# Patient Record
Sex: MALE | Race: OTHER | HISPANIC OR LATINO
[De-identification: names, ages, dates, MRNs, and addresses within clinical notes are randomized per-mention and may not be internally consistent; named-entity substitution may affect disease eponyms.]

---

## 2023-06-30 ENCOUNTER — NON-APPOINTMENT (OUTPATIENT)
Age: 48
End: 2023-06-30

## 2023-07-04 PROBLEM — Z00.00 ENCOUNTER FOR PREVENTIVE HEALTH EXAMINATION: Status: ACTIVE | Noted: 2023-07-04

## 2023-07-05 ENCOUNTER — APPOINTMENT (OUTPATIENT)
Dept: ORTHOPEDIC SURGERY | Facility: CLINIC | Age: 48
End: 2023-07-05
Payer: COMMERCIAL

## 2023-07-05 VITALS — WEIGHT: 190 LBS | HEIGHT: 68 IN | BODY MASS INDEX: 28.79 KG/M2

## 2023-07-05 DIAGNOSIS — Z78.9 OTHER SPECIFIED HEALTH STATUS: ICD-10-CM

## 2023-07-05 PROCEDURE — 99204 OFFICE O/P NEW MOD 45 MIN: CPT

## 2023-07-05 NOTE — ASSESSMENT
[FreeTextEntry1] : The condition was explained to the patient.\par Discussed the risks and benefits of surgical vs non-surgical treatment. Patient elected to proceed with surgery.\par Discussed the risk of persistent pain, swelling, stiffness, loss of motion, weakness, and post-traumatic arthritis. Surgery also carries the risk of bleeding, infection - which may require antibiotics or surgical debridement - and risk of injury to tendons, blood vessels, and nerves - which may cause loss of function or loss of limb. Risk of scar tenderness, hypersensitivity, cold sensitivity, and CRPS. Risk of re-displacement, mal-union, and non-union, which may require additional surgery. Risk of hardware irritation, infection, or failure that may require premature hardware removal or additional surgery. May require extensive therapy post-operatively to optimize range of motion and function.\par Risk of acute carpal tunnel syndrome, which may require urgent decompression. Risk of tendonitis or tendon rupture, which may require surgery.\par Surgery also carries a risk of complications related to anesthesia.\par All patient questions were answered. Patient expressed understanding and would like to proceed with RIGHT distal radius fracture ORIF.\par - maintain sugartong splint. reviewed splint care instructions.\par - Emphasized the importance of performing finger range of motion exercises to reduce finger stiffness, which can cause grave dysfunction and is difficult to overcome once it sets in.\par - Elevate wrist above level of heart as much as possible to reduce swelling.\par - NWB to R hand.\par \par F/u after surgery. X-rays R wrist out of splint.\par

## 2023-07-05 NOTE — HISTORY OF PRESENT ILLNESS
[Sudden] : sudden [8] : 8 [Dull/Aching] : dull/aching [Localized] : localized [Constant] : constant [Nothing helps with pain getting better] : Nothing helps with pain getting better [de-identified] : 7/5/23: presents with wife - assisting with HPI and translation (Gabonese). declined .\par 47yo RHD male () presents for RIGHT wrist pain after falling 4 feet off a ladder on 7/1/23. Denies numbness/tingling.\par Went to [facility] on [address] => XR, sugartong splint.\par Using Tylenol PRN pain.\par \par Hx: none.\par Sx: ankle surgery.\par NKDA. [] : no [FreeTextEntry1] : Right hand/wrist [FreeTextEntry5] : Right hand/wrist injury that occurred after falling outside 07/01/2023. Went to .

## 2023-07-05 NOTE — IMAGING
[de-identified] : RIGHT HAND\par sugartong splint intact.\par good EPL, FPL. good finger extension, flex to half fist.\par SILT to median, ulnar, radial distribution. \par brisk cap refill all digits.\par no triggering.\par \par \par OUTSIDE XRAYS OF RIGHT WRIST 7/1/23: displaced distal radius intra-articular fx with dorsal angulation. avulsion fx off ulnar styloid.

## 2023-07-10 ENCOUNTER — APPOINTMENT (OUTPATIENT)
Age: 48
End: 2023-07-10
Payer: COMMERCIAL

## 2023-07-10 PROCEDURE — 25608 OPTX DST RD XART FX/EPI SEP2: CPT | Mod: AS,RT

## 2023-07-10 PROCEDURE — 25608 OPTX DST RD XART FX/EPI SEP2: CPT | Mod: RT

## 2023-07-11 RX ORDER — HYDROCODONE BITARTRATE AND ACETAMINOPHEN 5; 325 MG/1; MG/1
5-325 TABLET ORAL
Qty: 20 | Refills: 0 | Status: ACTIVE | COMMUNITY
Start: 2023-07-11 | End: 1900-01-01

## 2023-07-11 RX ORDER — HYDROCODONE BITARTRATE AND ACETAMINOPHEN 5; 325 MG/1; MG/1
5-325 TABLET ORAL
Qty: 20 | Refills: 0 | Status: DISCONTINUED | COMMUNITY
Start: 2023-07-10 | End: 2023-07-11

## 2023-07-20 ENCOUNTER — APPOINTMENT (OUTPATIENT)
Dept: ORTHOPEDIC SURGERY | Facility: CLINIC | Age: 48
End: 2023-07-20
Payer: COMMERCIAL

## 2023-07-20 VITALS — WEIGHT: 190 LBS | BODY MASS INDEX: 28.79 KG/M2 | HEIGHT: 68 IN

## 2023-07-20 PROCEDURE — 99024 POSTOP FOLLOW-UP VISIT: CPT

## 2023-07-20 PROCEDURE — 73110 X-RAY EXAM OF WRIST: CPT | Mod: RT

## 2023-07-20 NOTE — PHYSICAL EXAM
[de-identified] : RIGHT HAND\par incision clean, dry, and intact s/p distal radius fx volar ORIF. sutures in place. no erythema or drainage.\par good EPL, FPL. good finger extension, IF flex to mid-palm, other fingers flex to loose fist.\par SILT to median, ulnar, radial distribution. \par brisk cap refill all digits.\par no triggering.\par \par \par XRAYS OF RIGHT WRIST: stable position/alignment of distal radius fx s/p volar plate fixation. stable position/alignment of ulnar styloid fx.

## 2023-07-20 NOTE — HISTORY OF PRESENT ILLNESS
[Sudden] : sudden [Dull/Aching] : dull/aching [Localized] : localized [Constant] : constant [Nothing helps with pain getting better] : Nothing helps with pain getting better [5] : 5 [de-identified] : 7/20/23: 10 days s/p RIGHT distal radius fx ORIF on 7/10/23. pain well controlled. denies numbness/tingling. denies f/c or ill sx.\par \par 7/5/23: presents with wife - assisting with HPI and translation ([place]ian). declined .\par 49yo RHD male () presents for RIGHT wrist pain after falling 4 feet off a ladder on 7/1/23. Denies numbness/tingling.\par Went to [facility] on [address] => XR, sugartong splint.\par Using Tylenol PRN pain.\par \par Hx: none.\par Sx: ankle surgery.\par NKDA. [] : no [FreeTextEntry1] : Right hand/wrist [FreeTextEntry5] : PO#1- R.Wrist. Doing well since sx; pain is minimal.

## 2023-07-20 NOTE — ASSESSMENT
[FreeTextEntry1] : - sutures removed. ok to wash incision with soap and water. do not scrub or soak incision for 2 weeks. do not apply ointment/lotion to incision for 2 weeks.\par - prescribed OT. demonstrated HEP for wrist and digital ROM.\par - NWB to R hand.\par - Work: continue O[facility].\par \par F/u 2 weeks.

## 2023-07-31 ENCOUNTER — APPOINTMENT (OUTPATIENT)
Dept: ORTHOPEDIC SURGERY | Facility: CLINIC | Age: 48
End: 2023-07-31
Payer: COMMERCIAL

## 2023-07-31 PROCEDURE — 99024 POSTOP FOLLOW-UP VISIT: CPT

## 2023-07-31 NOTE — HISTORY OF PRESENT ILLNESS
[de-identified] : 7/31/23: 4.5 weeks s/p RIGHT distal radius fx ORIF on 7/10/23. doing well. OT 2x/wk @[facility].  7/20/23: 10 days s/p RIGHT distal radius fx ORIF on 7/10/23. pain well controlled. denies numbness/tingling. denies f/c or ill sx.  7/5/23: presents with wife - assisting with HPI and translation (Khmer). declined . 49yo RHD male () presents for RIGHT wrist pain after falling 4 feet off a ladder on 7/1/23. Denies numbness/tingling. Went to [facility] on [address] => XR, sugartong splint. Using Tylenol PRN pain.  Hx: none. Sx: ankle surgery. NKDA. [] : no [FreeTextEntry1] : RIGHT wrist  [FreeTextEntry5] : CRYSTAL is here today for RIGHT wrist post op #2. pt states pain is minimal. attending OT.  [de-identified] : 07/10/23 [de-identified] : R distal radius fx ORIF

## 2023-07-31 NOTE — ASSESSMENT
[FreeTextEntry1] : - continue OT. continue HEP. - light activity. - Work: continue [facility].  F/u 4 weeks. X-rays R wrist. Reports that [place] will be [place] of the country [place] ~1 month, returns 8/28/23.

## 2023-09-08 ENCOUNTER — APPOINTMENT (OUTPATIENT)
Dept: ORTHOPEDIC SURGERY | Facility: CLINIC | Age: 48
End: 2023-09-08
Payer: COMMERCIAL

## 2023-09-08 PROCEDURE — 99024 POSTOP FOLLOW-UP VISIT: CPT

## 2023-09-08 PROCEDURE — 73110 X-RAY EXAM OF WRIST: CPT | Mod: RT

## 2023-09-08 NOTE — PHYSICAL EXAM
[de-identified] : RIGHT HAND well healed scar s/p distal radius fx volar ORIF. wrist ROM: extension 45, flexion 55. mild limited pronation, good supination. good EPL, FPL. good finger extension, flex to full fist. good finger abduction, adduction. SILT to median, ulnar, radial distribution.  brisk cap refill all digits. no triggering.   XRAYS OF RIGHT WRIST: stable position/alignment of distal radius fx s/p volar plate fixation, interval healing, no hardware complications.

## 2023-09-08 NOTE — ASSESSMENT
[FreeTextEntry1] : - again renewed OT. continue HEP. - advance activity as tolerated. - Work: He has returned to work without restrictions.  F/u 6 weeks.

## 2023-09-08 NOTE — HISTORY OF PRESENT ILLNESS
[] : Post Surgical Visit: yes [de-identified] : 9/8/23: 8.5 weeks s/p RIGHT distal radius fx ORIF on 7/10/23. Doing well. Denies pain with activity. Has not resumed OT. He has returned to work without issue.  7/31/23: 4.5 weeks s/p RIGHT distal radius fx ORIF on 7/10/23. doing well. OT 2x/wk @[facility].  7/20/23: 10 days s/p RIGHT distal radius fx ORIF on 7/10/23. pain well controlled. denies numbness/tingling. denies f/c or ill sx.  7/5/23: presents with wife - assisting with HPI and translation ([place]). declined . 49yo RHD male () presents for RIGHT wrist pain after falling 4 feet off a ladder on 7/1/23. Denies numbness/tingling. [facility] => XR, sugartong splint. Using Tylenol PRN pain.  Hx: none. Sx: ankle surgery. NKDA. [FreeTextEntry1] : RIGHT wrist  [de-identified] : 07/10/23 [FreeTextEntry5] : CRYSTAL is here today for RIGHT wrist post op #3.  [de-identified] : RIGHT distal radius fx ORIF

## 2023-10-13 ENCOUNTER — APPOINTMENT (OUTPATIENT)
Dept: ORTHOPEDIC SURGERY | Facility: CLINIC | Age: 48
End: 2023-10-13
Payer: COMMERCIAL

## 2023-10-13 PROCEDURE — 99213 OFFICE O/P EST LOW 20 MIN: CPT

## 2024-01-12 ENCOUNTER — APPOINTMENT (OUTPATIENT)
Dept: ORTHOPEDIC SURGERY | Facility: CLINIC | Age: 49
End: 2024-01-12
Payer: COMMERCIAL

## 2024-01-12 DIAGNOSIS — S52.571A OTHER INTRAARTICULAR FRACTURE OF LOWER END OF RIGHT RADIUS, INITIAL ENCOUNTER FOR CLOSED FRACTURE: ICD-10-CM

## 2024-01-12 PROCEDURE — 99212 OFFICE O/P EST SF 10 MIN: CPT

## 2024-01-12 PROCEDURE — 73110 X-RAY EXAM OF WRIST: CPT | Mod: RT

## 2024-01-12 NOTE — IMAGING
[de-identified] : RIGHT HAND well healed scar s/p distal radius fx volar ORIF. skin intact. no swelling. no TTP. wrist ROM: mild limited extension, flexion. good pronation, supination. good EPL, FPL. good finger extension, flex to full fist. good finger abduction, adduction. SILT to median, ulnar, radial distribution.  brisk cap refill all digits. no triggering.   XRAYS OF RIGHT WRIST: no acute displaced fracture or dislocation. s/p distal radius ORIF, no hardware complications.

## 2024-01-12 NOTE — HISTORY OF PRESENT ILLNESS
[] : yes [FreeTextEntry1] : RIGHT wrist  [de-identified] : 1/12/24: 6 months s/p RIGHT distal radius fx ORIF on 7/10/23. doing well, denies pain.  10/13/23: 3 months s/p RIGHT distal radius fx ORIF on 7/10/23. doing well, denies pain. OT 1x/wk and performing HEP.  9/8/23: 8.5 weeks s/p RIGHT distal radius fx ORIF on 7/10/23. Doing well. Denies pain with activity. Has not resumed OT. He has returned to work without issue.  7/31/23: 4.5 weeks s/p RIGHT distal radius fx ORIF on 7/10/23. doing well. OT 2x/wk @[facility].  7/20/23: 10 days s/p RIGHT distal radius fx ORIF on 7/10/23. pain well controlled. denies numbness/tingling. denies f/c or ill sx.  7/5/23: presents with wife - assisting with HPI and translation (Portuguese). declined . 49yo RHD male () presents for RIGHT wrist pain after falling 4 feet off a ladder on 7/1/23. Denies numbness/tingling. Went to [facility] on [address] => XR, sugartong splint. Using Tylenol PRN pain.  Hx: none. Sx: ankle surgery. NKDA. [FreeTextEntry5] : CRYSTAL is here today to follow up on his RIGHT wrist. pt denies pain today.  [de-identified] : 07/10/23

## 2024-01-12 NOTE — ASSESSMENT
[FreeTextEntry1] : - continue HEP for 1 year post-operatively. - activity as tolerated.  F/u 6 months. X-rays R wrist + elevated lateral.

## 2024-07-12 ENCOUNTER — APPOINTMENT (OUTPATIENT)
Dept: ORTHOPEDIC SURGERY | Facility: CLINIC | Age: 49
End: 2024-07-12